# Patient Record
Sex: MALE | Race: OTHER | Employment: FULL TIME | URBAN - METROPOLITAN AREA
[De-identification: names, ages, dates, MRNs, and addresses within clinical notes are randomized per-mention and may not be internally consistent; named-entity substitution may affect disease eponyms.]

---

## 2017-01-23 ENCOUNTER — OFFICE VISIT (OUTPATIENT)
Dept: OTOLARYNGOLOGY | Facility: CLINIC | Age: 47
End: 2017-01-23

## 2017-01-23 VITALS
TEMPERATURE: 98 F | DIASTOLIC BLOOD PRESSURE: 70 MMHG | SYSTOLIC BLOOD PRESSURE: 108 MMHG | WEIGHT: 215 LBS | BODY MASS INDEX: 34.55 KG/M2 | HEIGHT: 66 IN

## 2017-01-23 DIAGNOSIS — J38.1 VOCAL CORD POLYPS: Primary | ICD-10-CM

## 2017-01-23 PROCEDURE — 31575 DIAGNOSTIC LARYNGOSCOPY: CPT | Performed by: OTOLARYNGOLOGY

## 2017-01-23 PROCEDURE — 99243 OFF/OP CNSLTJ NEW/EST LOW 30: CPT | Performed by: OTOLARYNGOLOGY

## 2017-01-23 RX ORDER — OMEPRAZOLE 40 MG/1
40 CAPSULE, DELAYED RELEASE ORAL DAILY
Qty: 30 CAPSULE | Refills: 11 | Status: SHIPPED | OUTPATIENT
Start: 2017-01-23 | End: 2017-02-22

## 2017-01-23 NOTE — PROGRESS NOTES
Gordon Hoffmann is a 52year old male. Patient presents with:  Voice Problem: horseness of voice since november 2016      [address] [place] [address]  1/23/2016  Patient presents for evaluation of hoarseness  This has been going on for 3 months .   It is no Normal.   Psychiatric Normal Orientation - Oriented to time, place, person & situation. Appropriate mood and affect.    Neck Exam Normal Inspection - Normal. Palpation - Normal. Parotid gland - Normal. Thyroid gland  Shelly;     Eyes Normal Conjunctiva - Rig removed. The patient tolerated the procedure well and was discharged in stable condition    Current outpatient prescriptions:   •  Omeprazole 40 MG Oral Capsule Delayed Release, Take 1 capsule (40 mg total) by mouth daily. , Disp: 30 capsule, Rfl: 11    ASS

## 2017-01-25 NOTE — PATIENT INSTRUCTIONS
Vocal Cord Dysfunction (VCD)  You have been told that you may have vocal cord dysfunction (VCD). What is VCD? The vocal cords are two bands of muscle and connective inside the larynx (voice box).  The larynx rests at the top of your trachea (windpipe) · Relaxation techniques. Deep breathing, meditation, and activities like yoga can help reduce stress. · Biofeedback. This teaches you how to control certain physical functions and responses. You learn how to reduce muscle tension. · Psychotherapy.  This t

## 2017-03-06 ENCOUNTER — APPOINTMENT (OUTPATIENT)
Dept: LAB | Facility: HOSPITAL | Age: 47
End: 2017-03-06
Attending: OTOLARYNGOLOGY
Payer: COMMERCIAL

## 2017-03-06 ENCOUNTER — HOSPITAL ENCOUNTER (OUTPATIENT)
Dept: GENERAL RADIOLOGY | Facility: HOSPITAL | Age: 47
Discharge: HOME | End: 2017-03-06
Attending: OTOLARYNGOLOGY
Payer: COMMERCIAL

## 2017-03-06 ENCOUNTER — OFFICE VISIT (OUTPATIENT)
Dept: OTOLARYNGOLOGY | Facility: CLINIC | Age: 47
End: 2017-03-06

## 2017-03-06 VITALS
DIASTOLIC BLOOD PRESSURE: 93 MMHG | TEMPERATURE: 98 F | HEIGHT: 66 IN | BODY MASS INDEX: 35.36 KG/M2 | SYSTOLIC BLOOD PRESSURE: 149 MMHG | WEIGHT: 220 LBS

## 2017-03-06 DIAGNOSIS — J38.1 VOCAL CORD POLYPS: Primary | ICD-10-CM

## 2017-03-06 DIAGNOSIS — Z01.818 PRE-OP TESTING: ICD-10-CM

## 2017-03-06 PROCEDURE — 93010 ELECTROCARDIOGRAM REPORT: CPT | Performed by: OTOLARYNGOLOGY

## 2017-03-06 PROCEDURE — 93005 ELECTROCARDIOGRAM TRACING: CPT

## 2017-03-06 PROCEDURE — 71020 XR CHEST PA + LAT CHEST (CPT=71020): CPT

## 2017-03-06 PROCEDURE — 31575 DIAGNOSTIC LARYNGOSCOPY: CPT | Performed by: OTOLARYNGOLOGY

## 2017-03-06 PROCEDURE — 99213 OFFICE O/P EST LOW 20 MIN: CPT | Performed by: OTOLARYNGOLOGY

## 2017-03-06 RX ORDER — OMEPRAZOLE 20 MG/1
40 CAPSULE, DELAYED RELEASE ORAL
COMMUNITY

## 2017-03-06 NOTE — PROGRESS NOTES
Gia Chauhan is a 52year old male. Patient presents with:   Follow - Up: 6 week follow up- vocal cord polyps- per pt he feels much better with his throat/voice      HISTORY OF PRESENT ILLNESS  1/23/2016  Patient presents for evaluation of hoarseness  T EXAM    /93 mmHg  Temp(Src) 97.8 °F (36.6 °C) (Tympanic)  Ht 5' 6\" (1.676 m)  Wt 220 lb (99.791 kg)  BMI 35.53 kg/m2       Constitutional Normal Overall appearance - Normal.   Psychiatric Normal Orientation - Oriented to time, place, person & situat Nasopharynx was free of masses and audra were patent. Oropharynx was then examined and the patient has a normal tongue base and lingual tonsils. Epiglottis was  true and false cords were arytenoids and pyriform sinuses were visualized.      Anormalities note

## 2017-03-13 ENCOUNTER — APPOINTMENT (OUTPATIENT)
Dept: LAB | Facility: HOSPITAL | Age: 47
End: 2017-03-13
Attending: OTOLARYNGOLOGY
Payer: COMMERCIAL

## 2017-03-13 DIAGNOSIS — Z01.818 PRE-OP TESTING: ICD-10-CM

## 2017-03-13 LAB
ANION GAP SERPL CALC-SCNC: 7 MMOL/L (ref 0–18)
BUN SERPL-MCNC: 11 MG/DL (ref 8–20)
BUN/CREAT SERPL: 15.1 (ref 10–20)
CALCIUM SERPL-MCNC: 8.9 MG/DL (ref 8.5–10.5)
CHLORIDE SERPL-SCNC: 104 MMOL/L (ref 95–110)
CO2 SERPL-SCNC: 30 MMOL/L (ref 22–32)
CREAT SERPL-MCNC: 0.73 MG/DL (ref 0.5–1.5)
GLUCOSE SERPL-MCNC: 107 MG/DL (ref 70–99)
OSMOLALITY UR CALC.SUM OF ELEC: 292 MOSM/KG (ref 275–295)
POTASSIUM SERPL-SCNC: 3.6 MMOL/L (ref 3.3–5.1)
SODIUM SERPL-SCNC: 141 MMOL/L (ref 136–144)

## 2017-03-13 PROCEDURE — 36415 COLL VENOUS BLD VENIPUNCTURE: CPT

## 2017-03-13 PROCEDURE — 80048 BASIC METABOLIC PNL TOTAL CA: CPT

## 2017-03-15 ENCOUNTER — LAB REQUISITION (OUTPATIENT)
Dept: LAB | Facility: HOSPITAL | Age: 47
End: 2017-03-15
Payer: COMMERCIAL

## 2017-03-15 DIAGNOSIS — J38.7 OTHER DISEASES OF LARYNX: ICD-10-CM

## 2017-03-15 DIAGNOSIS — Z01.89 ENCOUNTER FOR OTHER SPECIFIED SPECIAL EXAMINATIONS: ICD-10-CM

## 2017-03-15 PROCEDURE — 88305 TISSUE EXAM BY PATHOLOGIST: CPT | Performed by: OTOLARYNGOLOGY

## 2017-03-16 ENCOUNTER — TELEPHONE (OUTPATIENT)
Dept: OTOLARYNGOLOGY | Facility: CLINIC | Age: 47
End: 2017-03-16

## 2017-03-16 NOTE — TELEPHONE ENCOUNTER
Per pt is doing well post op no bleeding or fever. Per , pt to do voice rest for the next couple of days, pt can take OTC tylenol and motrin for pain.  Pt advised to contact our office if any bleeding, or  uncontrolled pain, pt verbalized Aziza Ivey

## 2017-03-22 ENCOUNTER — OFFICE VISIT (OUTPATIENT)
Dept: OTOLARYNGOLOGY | Facility: CLINIC | Age: 47
End: 2017-03-22

## 2017-03-22 VITALS
BODY MASS INDEX: 35.36 KG/M2 | SYSTOLIC BLOOD PRESSURE: 106 MMHG | TEMPERATURE: 98 F | HEIGHT: 66 IN | DIASTOLIC BLOOD PRESSURE: 72 MMHG | WEIGHT: 220 LBS

## 2017-03-22 DIAGNOSIS — J38.1 VOCAL CORD POLYPS: Primary | ICD-10-CM

## 2017-03-22 PROCEDURE — 99212 OFFICE O/P EST SF 10 MIN: CPT | Performed by: OTOLARYNGOLOGY

## 2017-03-22 PROCEDURE — 99024 POSTOP FOLLOW-UP VISIT: CPT | Performed by: OTOLARYNGOLOGY

## 2017-03-22 NOTE — PROGRESS NOTES
Ludin Manning is a 52year old male.  Patient presents with:  Post-Op: direct laryngoscopy with biopsy done on 3-15, pt is doing well            Social History   Marital Status: Unknown  Spouse Name: N/A    Years of Education: N/A  Number of Children: N/A

## 2017-04-04 ENCOUNTER — TELEPHONE (OUTPATIENT)
Dept: OTOLARYNGOLOGY | Facility: CLINIC | Age: 47
End: 2017-04-04

## 2017-04-04 NOTE — TELEPHONE ENCOUNTER
Per had vocal cord polyp removed on 03-15, per pt was doing well and seen on post op visit on 03/22.  Pt states last Saturday he felt like he was getting a cold, and his hoarseness in voice worsened, per pt no fever or congestion or cough and pt states no p

## 2017-12-07 ENCOUNTER — TELEPHONE (OUTPATIENT)
Dept: OTOLARYNGOLOGY | Facility: CLINIC | Age: 47
End: 2017-12-07

## 2017-12-07 NOTE — TELEPHONE ENCOUNTER
Carlos Manuel Jones requesting LOV notes be sent a second time to office. [facility] staff resending LOV notes.  LAXMI

## 2017-12-07 NOTE — TELEPHONE ENCOUNTER
Pt's LOV notes and operative report faxed to Rodney Guerrero @ Dr[facility] office, [phone]; confirmation rec'd.

## (undated) NOTE — MR AVS SNAPSHOT
[facility]  [address]  [phone]               Thank you for choosing us for your health care visit with Purvi Holloway MD.  We are glad to serve you and happy to provide you with this summary Tips for making healthy food choices  -   Enjoy your food, but eat less. Fully enjoy your food when eating. Don’t eat while distracted and slow down. Avoid over sized portions. Don’t eat while when you’re bored.      EAT THESE FOODS MORE OFTEN: EAT T

## (undated) NOTE — MR AVS SNAPSHOT
[facility]  [address]  [phone]               Thank you for choosing us for your health care visit with Saravanan Torres MD.  We are glad to serve you and happy to provide you with this summary It is the patient's responsibility to check with and follow their insurance company's guidelines for prior authorization for this test.  You may be held responsible for payment in full if proper authorization is not acquired.   Please contact the Patient Bu Women and lighter weight persons: limit to <= 1 drink* per day.                            Visit [facility] online at  [email or web address]

## (undated) NOTE — MR AVS SNAPSHOT
[facility]  [address]  [phone]               Thank you for choosing us for your health care visit with Rolando Medina MD.  We are glad to serve you and happy to provide you with this summary